# Patient Record
Sex: FEMALE | Race: WHITE | URBAN - METROPOLITAN AREA
[De-identification: names, ages, dates, MRNs, and addresses within clinical notes are randomized per-mention and may not be internally consistent; named-entity substitution may affect disease eponyms.]

---

## 2024-07-02 ENCOUNTER — APPOINTMENT (OUTPATIENT)
Dept: PRIMARY CARE | Facility: CLINIC | Age: 79
End: 2024-07-02
Payer: MEDICARE

## 2024-12-24 ENCOUNTER — APPOINTMENT (OUTPATIENT)
Dept: PRIMARY CARE | Facility: CLINIC | Age: 79
End: 2024-12-24
Payer: MEDICARE

## 2024-12-24 ENCOUNTER — LAB (OUTPATIENT)
Dept: LAB | Facility: LAB | Age: 79
End: 2024-12-24
Payer: MEDICARE

## 2024-12-24 VITALS
BODY MASS INDEX: 25.99 KG/M2 | HEART RATE: 66 BPM | DIASTOLIC BLOOD PRESSURE: 78 MMHG | WEIGHT: 146.7 LBS | OXYGEN SATURATION: 97 % | HEIGHT: 63 IN | SYSTOLIC BLOOD PRESSURE: 144 MMHG

## 2024-12-24 DIAGNOSIS — H93.19 TINNITUS, UNSPECIFIED LATERALITY: ICD-10-CM

## 2024-12-24 DIAGNOSIS — I10 ACCELERATED ESSENTIAL HYPERTENSION: ICD-10-CM

## 2024-12-24 DIAGNOSIS — Z13.220 LIPID SCREENING: ICD-10-CM

## 2024-12-24 DIAGNOSIS — Z00.00 ENCOUNTER FOR ANNUAL WELLNESS EXAM IN MEDICARE PATIENT: Primary | ICD-10-CM

## 2024-12-24 DIAGNOSIS — Z00.00 ROUTINE GENERAL MEDICAL EXAMINATION AT A HEALTH CARE FACILITY: ICD-10-CM

## 2024-12-24 DIAGNOSIS — E55.9 VITAMIN D DEFICIENCY: ICD-10-CM

## 2024-12-24 DIAGNOSIS — H81.10 BENIGN PAROXYSMAL POSITIONAL VERTIGO, UNSPECIFIED LATERALITY: ICD-10-CM

## 2024-12-24 DIAGNOSIS — M81.0 OSTEOPOROSIS: ICD-10-CM

## 2024-12-24 DIAGNOSIS — R42 DIZZINESS: ICD-10-CM

## 2024-12-24 PROBLEM — Z13.820 SCREENING FOR OSTEOPOROSIS: Status: ACTIVE | Noted: 2024-12-24

## 2024-12-24 LAB
25(OH)D3 SERPL-MCNC: 62 NG/ML (ref 30–100)
ALBUMIN SERPL BCP-MCNC: 3.5 G/DL (ref 3.4–5)
ALP SERPL-CCNC: 116 U/L (ref 33–136)
ALT SERPL W P-5'-P-CCNC: 19 U/L (ref 7–45)
ANION GAP SERPL CALC-SCNC: 11 MMOL/L (ref 10–20)
APPEARANCE UR: ABNORMAL
AST SERPL W P-5'-P-CCNC: 34 U/L (ref 9–39)
BILIRUB SERPL-MCNC: 0.6 MG/DL (ref 0–1.2)
BILIRUB UR STRIP.AUTO-MCNC: NEGATIVE MG/DL
BUN SERPL-MCNC: 16 MG/DL (ref 6–23)
CALCIUM SERPL-MCNC: 9.3 MG/DL (ref 8.6–10.6)
CHLORIDE SERPL-SCNC: 104 MMOL/L (ref 98–107)
CHOLEST SERPL-MCNC: 140 MG/DL (ref 0–199)
CHOLESTEROL/HDL RATIO: 2.8
CO2 SERPL-SCNC: 30 MMOL/L (ref 21–32)
COLOR UR: ABNORMAL
CREAT SERPL-MCNC: 0.77 MG/DL (ref 0.5–1.05)
EGFRCR SERPLBLD CKD-EPI 2021: 78 ML/MIN/1.73M*2
ERYTHROCYTE [DISTWIDTH] IN BLOOD BY AUTOMATED COUNT: 13.9 % (ref 11.5–14.5)
GLUCOSE SERPL-MCNC: 91 MG/DL (ref 74–99)
GLUCOSE UR STRIP.AUTO-MCNC: NORMAL MG/DL
HCT VFR BLD AUTO: 37.5 % (ref 36–46)
HDLC SERPL-MCNC: 49.4 MG/DL
HGB BLD-MCNC: 11.5 G/DL (ref 12–16)
KETONES UR STRIP.AUTO-MCNC: NEGATIVE MG/DL
LDLC SERPL CALC-MCNC: 60 MG/DL
LEUKOCYTE ESTERASE UR QL STRIP.AUTO: NEGATIVE
MCH RBC QN AUTO: 28.3 PG (ref 26–34)
MCHC RBC AUTO-ENTMCNC: 30.7 G/DL (ref 32–36)
MCV RBC AUTO: 92 FL (ref 80–100)
NITRITE UR QL STRIP.AUTO: NEGATIVE
NON HDL CHOLESTEROL: 91 MG/DL (ref 0–149)
NRBC BLD-RTO: 0 /100 WBCS (ref 0–0)
PH UR STRIP.AUTO: 6 [PH]
PLATELET # BLD AUTO: 239 X10*3/UL (ref 150–450)
POTASSIUM SERPL-SCNC: 4.1 MMOL/L (ref 3.5–5.3)
PROT SERPL-MCNC: 6.1 G/DL (ref 6.4–8.2)
PROT UR STRIP.AUTO-MCNC: NEGATIVE MG/DL
RBC # BLD AUTO: 4.06 X10*6/UL (ref 4–5.2)
RBC # UR STRIP.AUTO: NEGATIVE /UL
SODIUM SERPL-SCNC: 141 MMOL/L (ref 136–145)
SP GR UR STRIP.AUTO: 1.02
TRIGL SERPL-MCNC: 153 MG/DL (ref 0–149)
TSH SERPL-ACNC: 1.06 MIU/L (ref 0.44–3.98)
UROBILINOGEN UR STRIP.AUTO-MCNC: NORMAL MG/DL
VIT B12 SERPL-MCNC: 650 PG/ML (ref 211–911)
VLDL: 31 MG/DL (ref 0–40)
WBC # BLD AUTO: 3.8 X10*3/UL (ref 4.4–11.3)

## 2024-12-24 PROCEDURE — 99203 OFFICE O/P NEW LOW 30 MIN: CPT | Performed by: FAMILY MEDICINE

## 2024-12-24 PROCEDURE — 1160F RVW MEDS BY RX/DR IN RCRD: CPT | Performed by: FAMILY MEDICINE

## 2024-12-24 PROCEDURE — 80061 LIPID PANEL: CPT

## 2024-12-24 PROCEDURE — G0438 PPPS, INITIAL VISIT: HCPCS | Performed by: FAMILY MEDICINE

## 2024-12-24 PROCEDURE — 1036F TOBACCO NON-USER: CPT | Performed by: FAMILY MEDICINE

## 2024-12-24 PROCEDURE — 1170F FXNL STATUS ASSESSED: CPT | Performed by: FAMILY MEDICINE

## 2024-12-24 PROCEDURE — 82607 VITAMIN B-12: CPT

## 2024-12-24 PROCEDURE — 80053 COMPREHEN METABOLIC PANEL: CPT

## 2024-12-24 PROCEDURE — 81003 URINALYSIS AUTO W/O SCOPE: CPT

## 2024-12-24 PROCEDURE — 82306 VITAMIN D 25 HYDROXY: CPT

## 2024-12-24 PROCEDURE — 3078F DIAST BP <80 MM HG: CPT | Performed by: FAMILY MEDICINE

## 2024-12-24 PROCEDURE — 1159F MED LIST DOCD IN RCRD: CPT | Performed by: FAMILY MEDICINE

## 2024-12-24 PROCEDURE — 84443 ASSAY THYROID STIM HORMONE: CPT

## 2024-12-24 PROCEDURE — 85027 COMPLETE CBC AUTOMATED: CPT

## 2024-12-24 PROCEDURE — 3077F SYST BP >= 140 MM HG: CPT | Performed by: FAMILY MEDICINE

## 2024-12-24 RX ORDER — BISMUTH SUBSALICYLATE 262 MG
1 TABLET,CHEWABLE ORAL DAILY
COMMUNITY

## 2024-12-24 RX ORDER — CYANOCOBALAMIN 1000 UG/ML
INJECTION, SOLUTION INTRAMUSCULAR; SUBCUTANEOUS
COMMUNITY

## 2024-12-24 ASSESSMENT — ACTIVITIES OF DAILY LIVING (ADL)
TAKING_MEDICATION: INDEPENDENT
DRESSING: INDEPENDENT
BATHING: INDEPENDENT
MANAGING_FINANCES: INDEPENDENT
GROCERY_SHOPPING: INDEPENDENT
DOING_HOUSEWORK: INDEPENDENT

## 2024-12-24 ASSESSMENT — PATIENT HEALTH QUESTIONNAIRE - PHQ9
SUM OF ALL RESPONSES TO PHQ9 QUESTIONS 1 AND 2: 0
2. FEELING DOWN, DEPRESSED OR HOPELESS: NOT AT ALL
2. FEELING DOWN, DEPRESSED OR HOPELESS: NOT AT ALL
SUM OF ALL RESPONSES TO PHQ9 QUESTIONS 1 AND 2: 0
1. LITTLE INTEREST OR PLEASURE IN DOING THINGS: NOT AT ALL
1. LITTLE INTEREST OR PLEASURE IN DOING THINGS: NOT AT ALL

## 2024-12-24 NOTE — PROGRESS NOTES
Patient ID: Anabelle Zamora [id number] is a 79 y.o. female who presents for [facility], Tinnitus, and Dizziness.    Patient is here to establish care and Medicare wellness.  Pt is lying down in bed and get Dizziness and lightheaded and ringing in the ear.  Patient noticing increasing tinnitus and bilateral ear throughout the day worse at night.  Patient denies any ear pain or ear discharge no headache no blurry vision no chest pain or shortness of breath    Pt has Crohn's disease - Pt Following Dr. Calvin At [facility]. Pt got 6 injection of Entevio and currently asymptomatic.   Patient denies any nausea vomiting or diarrhea.    Pt had right  knee replacement 7/2024- by dr. Alvarez  Exercise - None   Supplements - taking B12 Injection once a month and taking MVI  Denies smoking/ alcohol or drug use.   Immunization   Flu   , TDAp  , COVID , Pneumonia   , shingrix- declined all shots.  Colonoscopy- had 2 years ago , Marco Antonio LANCE at [facility]  Mammogram - pt is following Dr. Hooker for mammogram  Bone density Scan - given  Had a left humerus fracture last year.  Advised to take calcium vitamin D twice daily.  Patient otherwise independent doing everything by herself.  Patient has good memory good cognition no fall in last 6 months.    Immunization History   Administered Date(s) Administered    PPD Test 02/02/2004    Td (adult) 10/13/2003       Allergies   Allergen Reactions    Infliximab Anaphylaxis and Swelling     Remicade    Other Reaction(s): Anaphylactic Shock, swelling, hives      Remicade    Penicillins Rash and Hives     Other Reaction(s): swelling, hives       Current Outpatient Medications   Medication Sig Dispense Refill    cyanocobalamin (Vitamin B-12) 1,000 mcg/mL injection inject 1 ml under the skin every 4 weeks      MAGNESIUM GLYCINATE ORAL Take 400 mg by mouth 3 times a day.      multivitamin tablet Take 1 tablet by mouth once daily.      vitamin E acetate (VITAMIN E ORAL) Take by mouth.       No current  "facility-administered medications for this visit.       History reviewed. No pertinent past medical history.  Social History     Tobacco Use    Smoking status: Never    Smokeless tobacco: Never   Substance Use Topics    Alcohol use: Not Currently     No family history on file.    BP (!) 181/73   Pulse 66   Ht 1.6 m (5' 3\")   Wt 66.5 kg (146 lb 11.2 oz)   SpO2 97%   BMI 25.99 kg/m²   Review of System  Constitutional:  Negative for appetite change, chills, fatigue, fever and unexpected weight change.   HENT:  Negative for hearing loss and voice change.    Eyes:  Negative for visual disturbance.   Respiratory:  Negative for cough, chest tightness, shortness of breath and wheezing.    Cardiovascular:  Negative for chest pain and leg swelling.   Gastrointestinal:  Negative for abdominal pain, blood in stool, constipation and diarrhea.   Genitourinary:  Negative for difficulty urinating and dysuria.   Musculoskeletal:  Negative for arthralgias and joint swelling.   Skin:  Negative for rash.   Neurological:  Negative for dizziness, weakness, numbness and headaches.   Psychiatric/Behavioral:  Negative for behavioral problems and confusion.      BP (!) 181/73   Pulse 66   Ht 1.6 m (5' 3\")   Wt 66.5 kg (146 lb 11.2 oz)   SpO2 97%   BMI 25.99 kg/m²     General Appearance:    Alert, cooperative, no distress, appears stated age   Head:    Normocephalic, without obvious abnormality, atraumatic   Eyes:    PERRL, conjunctiva/corneas clear, EOM's intact,    Neck:   Supple, symmetrical, No enlargement   Back:     Symmetric, no curvature, ROM normal, no CVA tenderness   Lungs:     Clear to auscultation bilaterally, respirations normal ,no wheezing or ronchi   Chest Wall:    No tenderness or deformity   Abdomen:     Soft, non-tender, bowel sounds active all four quadrants, no masses,   no organomegaly   Extremities:   Extremities normal, atraumatic, no cyanosis or edema   Pulses:   2+ and symmetric all extremities   Skin:   " Skin color, texture, turgor normal, no rashes or lesions   Lymph nodes:   Cervical nodes normal   Neurologic:    normal strength, sensation and reflexes.      No visits with results within 3 Month(s) from this visit.   Latest known visit with results is:   Legacy Encounter on 10/06/1998   Component Date Value Ref Range Status    Pathology Report 10/06/1998    Final                    Value:Name CHARLIE SUTHERLAND                                                                                                   Accession #: [id number]            Pathologist:                     Date of Procedure:    10/6/1998  Date Received:          10/6/1998  Date Reported           10/9/1998  Submitting Physician:   Conversion  Location:                      Other External #                                                                    FINAL DIAGNOSIS   DIAGNOSIS:                  A.   CERVIX, LEEP BIOPSY:                    FOCAL CHRONIC CERVICITIS WITH SQUAMOUS METAPLASIA.          B.   ENDOCERVIX, CURETTINGS:                    FRAGMENTS OF UNREMARKABLE ENDOCERVICAL EPITHELIUM.                                                                   LARISSA Fields M.D.       - Signed by: Conversion [facility]              10/09/98                                                                                                                                                                                                                                                                                                                                                                                                                                                                                                                By the signature on this report, the individual or group listed as making the  Final Interpretation/Diagnosis certifies that they have reviewed this case.           Microscopic Description:   MICROSCOPIC DESCRIPTION:         "10/09/98                  Specimens \"A\" and \"B\" are examined microscopically and the        dictation is omitted.  AB:p                   Clinical History:  (Not Provided)    Specimens Submitted As:  A: Unknown Part Type     Gross Description:   GROSS DESCRIPTION:        10/07/98                  A.   The specimen consists of a LEEP biopsy of cervix, received        in three pieces and pinned on a piece of tongue depressor labelled as      to the 12 and 6 oclock posi                          tions.  The smallest piece measures 1.3 x      0.8 x 0.2 cm.  The largest piece measures 3.2 x 1.8 x 0.5 cm.  The      third piece measures 2.5 x 1.7 x 0.4 cm.  The specimen is divided        into four approximate quadrants, each quadrant sectioned into three      or more pieces and submitted in toto labelled: #1 - 12-3 oclock        position, #2 - 3-6 oclock position, #3 - 6-9 oclock position and #4      - 9-12 oclock position.            B.   The specimen consists of fragments of soft, mucoid, reddish      and grayish-white tissue altogether measuring approximately 1.0 x 0.5      x 0.1 cm. in aggregate.  Submitted in toto.  AB:p                DATE: 10/09/98        TIME  09:33                                [facility]  Department of Pathology   [address] [place] [address]        CONVERTED FINAL DIAGNOSIS 10/06/1998    Final                    Value: DIAGNOSIS:                  A.   CERVIX, LEEP BIOPSY:                    FOCAL CHRONIC CERVICITIS WITH SQUAMOUS METAPLASIA.          B.   ENDOCERVIX, CURETTINGS:                    FRAGMENTS OF UNREMARKABLE ENDOCERVICAL EPITHELIUM.                                                                   LARISSA Fields M.D.       - Signed by: Conversion [facility]              10/09/98      CONVERTED GROSS DESCRIPTION 10/06/1998    Final                    Value: GROSS DESCRIPTION:        10/07/98                  A.   The specimen consists of a LEEP " "biopsy of cervix, received        in three pieces and pinned on a piece of tongue depressor labelled as      to the 12 and 6 oclock positions.  The smallest piece measures 1.3 x      0.8 x 0.2 cm.  The largest piece measures 3.2 x 1.8 x 0.5 cm.  The      third piece measures 2.5 x 1.7 x 0.4 cm.  The specimen is divided        into four approximate quadrants, each quadrant sectioned into three      or more pieces and submitted in toto labelled: #1 - 12-3 oclock        position, #2 - 3-6 oclock position, #3 - 6-9 oclock position and #4      - 9-12 oclock position.            B.   The specimen consists of fragments of soft, mucoid, reddish      and grayish-white tissue altogether measuring approximately 1.0 x 0.5      x 0.1 cm. in aggregate.  Submitted in toto.  AB:p                DATE: 10/09/98        TIME  09:33                     CONVERTED MICROSCOPIC DESCRIPTION 10/06/1998    Final                    Value: MICROSCOPIC DESCRIPTION:        10/09/98                  Specimens \"A\" and \"B\" are examined microscopically and the        dictation is omitted.  AB:p                     CONVERTED SLIDE-BLOCK DESCRIPTION 10/06/1998    Final                    Value: - TISSUES -   Conversion Tissue (A: BIOPSY, CONIZATION)                               CONVERSION GYNECOLOGICAL (B: ENDOCERVICAL CURETTING)                        CONVERTED CARD CASE COMMENT 10/06/1998    Final                    Value: JAYE WAITE M.D.    NAME           [facility] NO.   [id number]                                      ROOM NO.       AMB OLIVO                                      DATE OF SURG.  10/06/98                                      AGE/SEX        53Y F                                      PHYSICIAN      DAVION WAITE                                                     PATHOLOGY REPORT                                        [id number]                        Operation:            LEEP " procedure                Specimen submitted:      A.   BIOPSY, CONIZATION                                  B.   ENDOCERVICAL CURETTING                      [employer] Archived Tests:    PHYSICIAN DIAGNOSIS : ;RECURRENT ABNORMAL PAP    [employer] SNOMED Translations for original [id number]:    [id number] (CERVIX UTERI (EXOCERVIX AND ENDOCERVIX)), [id number] (CHRONIC CE  RVICITIS, NOS     (T-20458))      [id number] (CERVIX UTERI (EXOCERVIX AND ENDOCERVIX)), [id number] (ME                          TAPLASIA ,  SQUAMOUS)      [id number] (ENDOCERVIX), [id number] (METAPLASIA, SQUAMOUS)      [id number] (ENDOCERVICAL EPITHELIUM), [id number] (METAPLASIA, SQUAMOUS)                    CONVERTED REPORT COMMENTS 10/06/1998    Final                    Value:Nathan Case # [id number]        MRN: [id number]  DOS: 10/06/98            - ORD PHYSICIAN: Conversion [facility]   - ORD PROCEDURES -            Conversion Procedure                                             - STATUS HISTORY -     ENT    09/15/2016  1811   Conversion [facility]    SOUT   09/15/2016  1811   Conversion [facility]                      - [place] Conversion Report -      CONVERTED FINAL REPORT PDF LINK TO* 10/06/1998 \\[email or web address]\[email or web address]\PDF [email or web address]_[id number]   Final       Assessment/Plan   Diagnoses and all orders for this visit:  Encounter for annual wellness exam in Medicare patient  Routine general medical examination at a health care facility  -     TSH with reflex to Free T4 if abnormal; Future  -     Lipid Panel; Future  -     Comprehensive Metabolic Panel; Future  -     CBC; Future  -     Urinalysis with Reflex Microscopic; Future  -     Vitamin B12; Future  -     Vitamin D 25-Hydroxy,Total (for eval of Vitamin D levels); Future  Lipid screening  Accelerated essential hypertension  -     CBC; Future  Benign paroxysmal positional vertigo, unspecified laterality  -     Vitamin B12; Future  -     Vitamin D 25-Hydroxy,Total (for eval of Vitamin D levels);  Future  Tinnitus, unspecified laterality  -     Vitamin B12; Future  -     Vitamin D 25-Hydroxy,Total (for eval of Vitamin D levels); Future  Screening for osteoporosis  -     XR DEXA bone density; Future  Osteoporosis  -     XR DEXA bone density; Future  Vitamin D deficiency  -     Vitamin D 25-Hydroxy,Total (for eval of Vitamin D levels); Future      Patient was advised to get blood work  Patient was advised to get bone density scan  Patient is following GYN Dr. Dow and having mammogram done every year  Patient was advised to do BPPV exercises 3 times a day  Will refer to PT for vestibular exercises  Patient made aware if persistent tinnitus she will need hearing eval and referral to ENT.  Patient would like to hold for now.    declined all vaccines

## 2025-01-06 ENCOUNTER — TELEPHONE (OUTPATIENT)
Dept: PRIMARY CARE | Facility: CLINIC | Age: 80
End: 2025-01-06
Payer: MEDICARE

## 2025-01-23 ENCOUNTER — APPOINTMENT (OUTPATIENT)
Dept: PRIMARY CARE | Facility: CLINIC | Age: 80
End: 2025-01-23
Payer: MEDICARE

## 2025-01-23 VITALS
HEART RATE: 73 BPM | SYSTOLIC BLOOD PRESSURE: 173 MMHG | HEIGHT: 63 IN | DIASTOLIC BLOOD PRESSURE: 81 MMHG | WEIGHT: 151.2 LBS | OXYGEN SATURATION: 96 % | BODY MASS INDEX: 26.79 KG/M2

## 2025-01-23 DIAGNOSIS — R42 DIZZINESS: ICD-10-CM

## 2025-01-23 DIAGNOSIS — K50.90 CROHN'S DISEASE WITHOUT COMPLICATION, UNSPECIFIED GASTROINTESTINAL TRACT LOCATION: Primary | ICD-10-CM

## 2025-01-23 DIAGNOSIS — H81.10 BENIGN PAROXYSMAL POSITIONAL VERTIGO, UNSPECIFIED LATERALITY: ICD-10-CM

## 2025-01-23 PROCEDURE — 1159F MED LIST DOCD IN RCRD: CPT | Performed by: FAMILY MEDICINE

## 2025-01-23 PROCEDURE — 99213 OFFICE O/P EST LOW 20 MIN: CPT | Performed by: FAMILY MEDICINE

## 2025-01-23 PROCEDURE — 1123F ACP DISCUSS/DSCN MKR DOCD: CPT | Performed by: FAMILY MEDICINE

## 2025-01-23 PROCEDURE — 3077F SYST BP >= 140 MM HG: CPT | Performed by: FAMILY MEDICINE

## 2025-01-23 PROCEDURE — 1036F TOBACCO NON-USER: CPT | Performed by: FAMILY MEDICINE

## 2025-01-23 PROCEDURE — 3079F DIAST BP 80-89 MM HG: CPT | Performed by: FAMILY MEDICINE

## 2025-01-23 RX ORDER — NORGESTIMATE AND ETHINYL ESTRADIOL 7DAYSX3 28
1 KIT ORAL DAILY
Qty: 28 TABLET | Refills: 12 | Status: CANCELLED | OUTPATIENT
Start: 2025-01-23 | End: 2026-01-23

## 2025-01-23 ASSESSMENT — PATIENT HEALTH QUESTIONNAIRE - PHQ9
2. FEELING DOWN, DEPRESSED OR HOPELESS: NOT AT ALL
1. LITTLE INTEREST OR PLEASURE IN DOING THINGS: NOT AT ALL
SUM OF ALL RESPONSES TO PHQ9 QUESTIONS 1 AND 2: 0

## 2025-01-23 NOTE — ASSESSMENT & PLAN NOTE
Patient following GI at [facility] currently asymptomatic. S/p Venancio

## 2025-01-23 NOTE — PROGRESS NOTES
"Patient ID: Anabelle Zamora [id number] is a 79 y.o. female who presents for Follow-up (Follow up).    Is here for follow-up  BPPV patient is doing BPPV exercises 2-3 times a day.  Dizziness is much better.  Denies any headache or blurry vision.  No chest pain or shortness of breath.    Pt has Crohn's disease - Pt Following Dr. Calvin At [facility]. Pt got 6 injection of Entevio and currently asymptomatic.   Patient denies any nausea vomiting or diarrhea.    Pt had right  knee replacement 7/2024- by dr. Alvarez   Supplements - taking B12 Injection once a month and taking MVI  Flu   , TDAp  , COVID , Pneumonia   , shingrix- declined all shots.  Colonoscopy- had 2 years ago , Marco Antonio LANCE at [facility]  Mammogram - pt is following Dr. Hooker for mammogram    Immunization History   Administered Date(s) Administered    PPD Test 02/02/2004    Td (adult) 10/13/2003       Allergies   Allergen Reactions    Infliximab Anaphylaxis and Swelling     Remicade    Other Reaction(s): Anaphylactic Shock, swelling, hives      Remicade    Penicillins Rash and Hives     Other Reaction(s): swelling, hives       Current Outpatient Medications   Medication Sig Dispense Refill    cyanocobalamin (Vitamin B-12) 1,000 mcg/mL injection inject 1 ml under the skin every 4 weeks      MAGNESIUM GLYCINATE ORAL Take 400 mg by mouth 3 times a day.      multivitamin tablet Take 1 tablet by mouth once daily.      vitamin E acetate (VITAMIN E ORAL) Take by mouth.       No current facility-administered medications for this visit.       Past Medical History:   Diagnosis Date    CD (Crohn's disease) (Multi)      Social History     Tobacco Use    Smoking status: Never    Smokeless tobacco: Never   Vaping Use    Vaping status: Never Used   Substance Use Topics    Alcohol use: Not Currently    Drug use: Never         /81   Pulse 73   Ht 1.6 m (5' 3\")   Wt 68.6 kg (151 lb 3.2 oz)   SpO2 96%   BMI 26.78 kg/m²     /79   Pulse 73   Ht 1.6 m (5' 3\")   Wt 68.6 kg " (151 lb 3.2 oz)   SpO2 96%   BMI 26.78 kg/m²     General Appearance:  Alert, cooperative, no distress,   Head:  Normocephalic, atraumatic   Eyes:  PERRL, conjunctiva/corneas clear, EOM's intact,    Lungs:   Clear to auscultation bilaterally, respirations unlabored   Heart:  Regular rate and rhythm, S1 and S2 normal, no murmur,    Extremities: Extremities normal, No edema   Neurologic: Normal       Lab on 12/24/2024   Component Date Value Ref Range Status    Thyroid Stimulating Hormone 12/24/2024 1.06  0.44 - 3.98 mIU/L Final    Cholesterol 12/24/2024 140  0 - 199 mg/dL Final          Age      Desirable   Borderline High   High     0-19 Y     0 - 169       170 - 199     >/= 200    20-24 Y     0 - 189       190 - 224     >/= 225         >24 Y     0 - 199       200 - 239     >/= 240   **All ranges are based on fasting samples. Specific   therapeutic targets will vary based on patient-specific   cardiac risk.    Pediatric guidelines reference:Pediatrics 2011, 128(S5).Adult guidelines reference: NCEP ATPIII Guidelines,[place] 2001, 258:2486-97    Venipuncture immediately after or during the administration of Metamizole may lead to falsely low results. Testing should be performed immediately prior to Metamizole dosing.    HDL-Cholesterol 12/24/2024 49.4  mg/dL Final      Age       Very Low   Low     Normal    High    0-19 Y    < 35      < 40     40-45     ----  20-24 Y    ----     < 40      >45      ----        >24 Y      ----     < 40     40-60      >60      Cholesterol/HDL Ratio 12/24/2024 2.8   Final      Ref Values  Desirable  < 3.4  High Risk  > 5.0    LDL Calculated 12/24/2024 60  <=99 mg/dL Final                                Near   Borderline      AGE      Desirable  Optimal    High     High     Very High     0-19 Y     0 - 109     ---    110-129   >/= 130     ----    20-24 Y     0 - 119     ---    120-159   >/= 160     ----      >24 Y     0 -  99   100-129  130-159   160-189     >/=190      VLDL 12/24/2024 31  0  - 40 mg/dL Final    Triglycerides 12/24/2024 153 (H)  0 - 149 mg/dL Final    Age              Desirable        Borderline         High        Very High  SEX:B           mg/dL             mg/dL               mg/dL      mg/dL  <=14D                       ----               ----        ----  15D-365D                    ----               ----        ----  1Y-9Y           0-74               75-99             >=100       ----  10Y-19Y        0-89                            >=130       ----  20Y-24Y        0-114             115-149             >=150      ----  >= 25Y         0-149             150-199             200-499    >=500      Venipuncture immediately after or during the administration of Metamizole may lead to falsely low results. Testing should be performed immediately prior to Metamizole dosing.    Non HDL Cholesterol 12/24/2024 91  0 - 149 mg/dL Final          Age       Desirable   Borderline High   High     Very High     0-19 Y     0 - 119       120 - 144     >/= 145    >/= 160    20-24 Y     0 - 149       150 - 189     >/= 190      ----         >24 Y    30 mg/dL above LDL Cholesterol goal      Glucose 12/24/2024 91  74 - 99 mg/dL Final    Sodium 12/24/2024 141  136 - 145 mmol/L Final    Potassium 12/24/2024 4.1  3.5 - 5.3 mmol/L Final    Chloride 12/24/2024 104  98 - 107 mmol/L Final    Bicarbonate 12/24/2024 30  21 - 32 mmol/L Final    Anion Gap 12/24/2024 11  10 - 20 mmol/L Final    Urea Nitrogen 12/24/2024 16  6 - 23 mg/dL Final    Creatinine 12/24/2024 0.77  0.50 - 1.05 mg/dL Final    eGFR 12/24/2024 78  >60 mL/min/1.73m*2 Final    Calculations of estimated GFR are performed using the 2021 CKD-EPI Study Refit equation without the race variable for the IDMS-Traceable creatinine methods.  https://jasn.asnjournals.org/content/early/2021/09/22/ASN.8663071062    Calcium 12/24/2024 9.3  8.6 - 10.6 mg/dL Final    Albumin 12/24/2024 3.5  3.4 - 5.0 g/dL Final    Alkaline Phosphatase 12/24/2024  116  33 - 136 U/L Final    Total Protein 12/24/2024 6.1 (L)  6.4 - 8.2 g/dL Final    AST 12/24/2024 34  9 - 39 U/L Final    Bilirubin, Total 12/24/2024 0.6  0.0 - 1.2 mg/dL Final    ALT 12/24/2024 19  7 - 45 U/L Final    Patients treated with Sulfasalazine may generate falsely decreased results for ALT.    WBC 12/24/2024 3.8 (L)  4.4 - 11.3 x10*3/uL Final    nRBC 12/24/2024 0.0  0.0 - 0.0 /100 WBCs Final    RBC 12/24/2024 4.06  4.00 - 5.20 x10*6/uL Final    Hemoglobin 12/24/2024 11.5 (L)  12.0 - 16.0 g/dL Final    Hematocrit 12/24/2024 37.5  36.0 - 46.0 % Final    MCV 12/24/2024 92  80 - 100 fL Final    MCH 12/24/2024 28.3  26.0 - 34.0 pg Final    MCHC 12/24/2024 30.7 (L)  32.0 - 36.0 g/dL Final    RDW 12/24/2024 13.9  11.5 - 14.5 % Final    Platelets 12/24/2024 239  150 - 450 x10*3/uL Final    Color, Urine 12/24/2024 Light-Yellow  Light-Yellow, Yellow, Dark-Yellow Final    Appearance, Urine 12/24/2024 Turbid (N)  Clear Final    Specific Gravity, Urine 12/24/2024 1.018  1.005 - 1.035 Final    pH, Urine 12/24/2024 6.0  5.0, 5.5, 6.0, 6.5, 7.0, 7.5, 8.0 Final    Protein, Urine 12/24/2024 NEGATIVE  NEGATIVE, 10 (TRACE), 20 (TRACE) mg/dL Final    Glucose, Urine 12/24/2024 Normal  Normal mg/dL Final    Blood, Urine 12/24/2024 NEGATIVE  NEGATIVE Final    Ketones, Urine 12/24/2024 NEGATIVE  NEGATIVE mg/dL Final    Bilirubin, Urine 12/24/2024 NEGATIVE  NEGATIVE Final    Urobilinogen, Urine 12/24/2024 Normal  Normal mg/dL Final    Nitrite, Urine 12/24/2024 NEGATIVE  NEGATIVE Final    Leukocyte Esterase, Urine 12/24/2024 NEGATIVE  NEGATIVE Final    Vitamin B12 12/24/2024 650  211 - 911 pg/mL Final    Vitamin D, 25-Hydroxy, Total 12/24/2024 62  30 - 100 ng/mL Final       Assessment/Plan   Diagnoses and all orders for this visit:  Anabelle was seen today for follow-up.  Diagnoses and all orders for this visit:  Crohn's disease without complication, unspecified gastrointestinal tract location (Primary)  Benign paroxysmal positional  vertigo, unspecified laterality  Dizziness    Dizziness better    reviewed blood work and normal  Patient was advised to get bone density scan  Patient is following GYN Dr. Dow and having mammogram done every year      declined all vaccines    Repeat BP 2 weeks as nurse visit

## 2025-01-24 ENCOUNTER — TELEPHONE (OUTPATIENT)
Dept: PRIMARY CARE | Facility: CLINIC | Age: 80
End: 2025-01-24
Payer: MEDICARE

## 2025-01-24 NOTE — TELEPHONE ENCOUNTER
----- Message from Awa Weaver sent at 1/23/2025  5:27 PM EST -----  Schedule Nurse Visit for repeat blood pressure in 2 to 3-week

## 2025-05-14 ENCOUNTER — OFFICE VISIT (OUTPATIENT)
Dept: PRIMARY CARE | Facility: CLINIC | Age: 80
End: 2025-05-14
Payer: MEDICARE

## 2025-05-14 VITALS
OXYGEN SATURATION: 96 % | HEIGHT: 63 IN | WEIGHT: 147.6 LBS | DIASTOLIC BLOOD PRESSURE: 66 MMHG | SYSTOLIC BLOOD PRESSURE: 130 MMHG | HEART RATE: 87 BPM | BODY MASS INDEX: 26.15 KG/M2

## 2025-05-14 DIAGNOSIS — L50.9 URTICARIA: Primary | ICD-10-CM

## 2025-05-14 PROCEDURE — 99214 OFFICE O/P EST MOD 30 MIN: CPT | Performed by: FAMILY MEDICINE

## 2025-05-14 PROCEDURE — 1036F TOBACCO NON-USER: CPT | Performed by: FAMILY MEDICINE

## 2025-05-14 PROCEDURE — 96372 THER/PROPH/DIAG INJ SC/IM: CPT | Performed by: FAMILY MEDICINE

## 2025-05-14 PROCEDURE — 3078F DIAST BP <80 MM HG: CPT | Performed by: FAMILY MEDICINE

## 2025-05-14 PROCEDURE — 1159F MED LIST DOCD IN RCRD: CPT | Performed by: FAMILY MEDICINE

## 2025-05-14 PROCEDURE — 3075F SYST BP GE 130 - 139MM HG: CPT | Performed by: FAMILY MEDICINE

## 2025-05-14 RX ORDER — FAMOTIDINE 20 MG/1
20 TABLET, FILM COATED ORAL 2 TIMES DAILY
COMMUNITY
Start: 2025-05-12 | End: 2025-05-19

## 2025-05-14 RX ORDER — FAMOTIDINE 20 MG/1
20 TABLET, FILM COATED ORAL 2 TIMES DAILY
Qty: 60 TABLET | Refills: 5 | Status: SHIPPED | OUTPATIENT
Start: 2025-05-14 | End: 2025-11-10

## 2025-05-14 RX ORDER — FEXOFENADINE HCL 180 MG/1
180 TABLET ORAL
COMMUNITY
Start: 2025-05-12

## 2025-05-14 RX ORDER — PARSLEY 450 MG
1 CAPSULE ORAL DAILY
COMMUNITY

## 2025-05-14 RX ORDER — TRIAMCINOLONE ACETONIDE 40 MG/ML
40 INJECTION, SUSPENSION INTRA-ARTICULAR; INTRAMUSCULAR ONCE
Status: COMPLETED | OUTPATIENT
Start: 2025-05-14 | End: 2025-05-14

## 2025-05-14 RX ORDER — CETIRIZINE HYDROCHLORIDE 10 MG/1
10 TABLET ORAL NIGHTLY
COMMUNITY
Start: 2025-05-12

## 2025-05-14 RX ORDER — PREDNISONE 20 MG/1
20 TABLET ORAL 2 TIMES DAILY
Qty: 10 TABLET | Refills: 0 | Status: SHIPPED | OUTPATIENT
Start: 2025-05-14 | End: 2025-05-19

## 2025-05-14 RX ORDER — HYDROXYZINE HYDROCHLORIDE 25 MG/1
25 TABLET, FILM COATED ORAL EVERY 6 HOURS PRN
COMMUNITY
Start: 2025-05-12 | End: 2025-05-19

## 2025-05-14 RX ADMIN — TRIAMCINOLONE ACETONIDE 40 MG: 40 INJECTION, SUSPENSION INTRA-ARTICULAR; INTRAMUSCULAR at 11:52

## 2025-05-14 ASSESSMENT — PATIENT HEALTH QUESTIONNAIRE - PHQ9
2. FEELING DOWN, DEPRESSED OR HOPELESS: NOT AT ALL
SUM OF ALL RESPONSES TO PHQ9 QUESTIONS 1 AND 2: 0
1. LITTLE INTEREST OR PLEASURE IN DOING THINGS: NOT AT ALL

## 2025-05-14 NOTE — PROGRESS NOTES
"Subjective   Patient ID: Anabelle Zamora [id number] is a 80 y.o. female who presents for Rash (In went to ER twice for hives not getting better and burning now).    HPI   Patient went to [facility] ER on May 11 and May 12 for rash on the chest and patient was given IV steroid and was sent home with hydroxyzine and Benadryl.  Patient noticing worsening of rash all over the body it is more erythematous patchy rash over chest abdomen back and upper back patient denies chest pain shortness of breath headache or dizziness.  Noticing itching.    Social History[1]    Allergies[2]    Current Medications[3]    Physical Exam  /81   Pulse 87   Ht 1.6 m (5' 3\")   Wt 67 kg (147 lb 9.6 oz)   SpO2 96%   BMI 26.15 kg/m²     General Appearance:  Alert, cooperative, no distress,   Head:  Normocephalic, atraumatic   Eyes:  PERRL, conjunctiva/corneas clear, EOM's intact,    Lungs:   Clear to auscultation bilaterally, respirations unlabored   Heart:  Regular rate and rhythm, S1 and S2 normal, no murmur,    Neurologic: Normal                                  SKIn - erythematous patchy rash all over chest,abdomen/back  No visits with results within 3 Month(s) from this visit.   Latest known visit with results is:   Lab on 12/24/2024   Component Date Value Ref Range Status    Thyroid Stimulating Hormone 12/24/2024 1.06  0.44 - 3.98 mIU/L Final    Cholesterol 12/24/2024 140  0 - 199 mg/dL Final          Age      Desirable   Borderline High   High     0-19 Y     0 - 169       170 - 199     >/= 200    20-24 Y     0 - 189       190 - 224     >/= 225         >24 Y     0 - 199       200 - 239     >/= 240   **All ranges are based on fasting samples. Specific   therapeutic targets will vary based on patient-specific   cardiac risk.    Pediatric guidelines reference:Pediatrics 2011, 128(S5).Adult guidelines reference: NCEP ATPIII Guidelines,[place] 2001, 258:2486-97    Venipuncture immediately after or during the administration of Metamizole " may lead to falsely low results. Testing should be performed immediately prior to Metamizole dosing.    HDL-Cholesterol 12/24/2024 49.4  mg/dL Final      Age       Very Low   Low     Normal    High    0-19 Y    < 35      < 40     40-45     ----  20-24 Y    ----     < 40      >45      ----        >24 Y      ----     < 40     40-60      >60      Cholesterol/HDL Ratio 12/24/2024 2.8   Final      Ref Values  Desirable  < 3.4  High Risk  > 5.0    LDL Calculated 12/24/2024 60  <=99 mg/dL Final                                Near   Borderline      AGE      Desirable  Optimal    High     High     Very High     0-19 Y     0 - 109     ---    110-129   >/= 130     ----    20-24 Y     0 - 119     ---    120-159   >/= 160     ----      >24 Y     0 -  99   100-129  130-159   160-189     >/=190      VLDL 12/24/2024 31  0 - 40 mg/dL Final    Triglycerides 12/24/2024 153 (H)  0 - 149 mg/dL Final    Age              Desirable        Borderline         High        Very High  SEX:B           mg/dL             mg/dL               mg/dL      mg/dL  <=14D                       ----               ----        ----  15D-365D                    ----               ----        ----  1Y-9Y           0-74               75-99             >=100       ----  10Y-19Y        0-89                            >=130       ----  20Y-24Y        0-114             115-149             >=150      ----  >= 25Y         0-149             150-199             200-499    >=500      Venipuncture immediately after or during the administration of Metamizole may lead to falsely low results. Testing should be performed immediately prior to Metamizole dosing.    Non HDL Cholesterol 12/24/2024 91  0 - 149 mg/dL Final          Age       Desirable   Borderline High   High     Very High     0-19 Y     0 - 119       120 - 144     >/= 145    >/= 160    20-24 Y     0 - 149       150 - 189     >/= 190      ----         >24 Y    30 mg/dL above LDL Cholesterol  goal      Glucose 12/24/2024 91  74 - 99 mg/dL Final    Sodium 12/24/2024 141  136 - 145 mmol/L Final    Potassium 12/24/2024 4.1  3.5 - 5.3 mmol/L Final    Chloride 12/24/2024 104  98 - 107 mmol/L Final    Bicarbonate 12/24/2024 30  21 - 32 mmol/L Final    Anion Gap 12/24/2024 11  10 - 20 mmol/L Final    Urea Nitrogen 12/24/2024 16  6 - 23 mg/dL Final    Creatinine 12/24/2024 0.77  0.50 - 1.05 mg/dL Final    eGFR 12/24/2024 78  >60 mL/min/1.73m*2 Final    Calculations of estimated GFR are performed using the 2021 CKD-EPI Study Refit equation without the race variable for the IDMS-Traceable creatinine methods.  https://jasn.asnjournals.org/content/early/2021/09/22/ASN.1330864257    Calcium 12/24/2024 9.3  8.6 - 10.6 mg/dL Final    Albumin 12/24/2024 3.5  3.4 - 5.0 g/dL Final    Alkaline Phosphatase 12/24/2024 116  33 - 136 U/L Final    Total Protein 12/24/2024 6.1 (L)  6.4 - 8.2 g/dL Final    AST 12/24/2024 34  9 - 39 U/L Final    Bilirubin, Total 12/24/2024 0.6  0.0 - 1.2 mg/dL Final    ALT 12/24/2024 19  7 - 45 U/L Final    Patients treated with Sulfasalazine may generate falsely decreased results for ALT.    WBC 12/24/2024 3.8 (L)  4.4 - 11.3 x10*3/uL Final    nRBC 12/24/2024 0.0  0.0 - 0.0 /100 WBCs Final    RBC 12/24/2024 4.06  4.00 - 5.20 x10*6/uL Final    Hemoglobin 12/24/2024 11.5 (L)  12.0 - 16.0 g/dL Final    Hematocrit 12/24/2024 37.5  36.0 - 46.0 % Final    MCV 12/24/2024 92  80 - 100 fL Final    MCH 12/24/2024 28.3  26.0 - 34.0 pg Final    MCHC 12/24/2024 30.7 (L)  32.0 - 36.0 g/dL Final    RDW 12/24/2024 13.9  11.5 - 14.5 % Final    Platelets 12/24/2024 239  150 - 450 x10*3/uL Final    Color, Urine 12/24/2024 Light-Yellow  Light-Yellow, Yellow, Dark-Yellow Final    Appearance, Urine 12/24/2024 Turbid (N)  Clear Final    Specific Gravity, Urine 12/24/2024 1.018  1.005 - 1.035 Final    pH, Urine 12/24/2024 6.0  5.0, 5.5, 6.0, 6.5, 7.0, 7.5, 8.0 Final    Protein, Urine 12/24/2024 NEGATIVE  NEGATIVE, 10  (TRACE), 20 (TRACE) mg/dL Final    Glucose, Urine 12/24/2024 Normal  Normal mg/dL Final    Blood, Urine 12/24/2024 NEGATIVE  NEGATIVE Final    Ketones, Urine 12/24/2024 NEGATIVE  NEGATIVE mg/dL Final    Bilirubin, Urine 12/24/2024 NEGATIVE  NEGATIVE Final    Urobilinogen, Urine 12/24/2024 Normal  Normal mg/dL Final    Nitrite, Urine 12/24/2024 NEGATIVE  NEGATIVE Final    Leukocyte Esterase, Urine 12/24/2024 NEGATIVE  NEGATIVE Final    Vitamin B12 12/24/2024 650  211 - 911 pg/mL Final    Vitamin D, 25-Hydroxy, Total 12/24/2024 62  30 - 100 ng/mL Final       Assessment/Plan   Diagnoses and all orders for this visit:  Urticaria  -     predniSONE (Deltasone) 20 mg tablet; Take 1 tablet (20 mg) by mouth 2 times a day for 5 days.  -     famotidine (Pepcid) 20 mg tablet; Take 1 tablet (20 mg) by mouth 2 times a day.  -     triamcinolone acetonide (Kenalog-40) injection 40 mg  Patient was advised to continue oral prednisone twice daily and Pepcid twice daily can take Benadryl every 6  Triamcinolone 40 given in the office  Return to clinic if not better or worse     patient was recently seen in the hospital and patient's hospital record has been reviewed with the patient including, but not limited to, discharge summary, labs, imaging, consultation notes (if pertinent). Ambulatory medication list and discharge hospitalization list has been compared and updated for changes.           [1]   Social History  Tobacco Use    Smoking status: Never    Smokeless tobacco: Never   Vaping Use    Vaping status: Never Used   Substance Use Topics    Alcohol use: Not Currently    Drug use: Never   [2]   Allergies  Allergen Reactions    Infliximab Anaphylaxis, Swelling and Hives     Remicade    Other Reaction(s): Anaphylactic Shock, swelling, hives      Remicade    Remicade (reaction was in the 70's)    Penicillins Rash and Hives     Other Reaction(s): swelling, hives   [3]   Current Outpatient Medications   Medication Sig Dispense Refill     cetirizine (ZyrTEC) 10 mg tablet Take 1 tablet (10 mg) by mouth once daily at bedtime.      cyanocobalamin (Vitamin B-12) 1,000 mcg/mL injection inject 1 ml under the skin every 4 weeks      famotidine (Pepcid) 20 mg tablet Take 1 tablet (20 mg) by mouth twice a day.      fexofenadine (Allegra) 180 mg tablet Take 1 tablet (180 mg) by mouth once daily.      hydrOXYzine HCL (Atarax) 25 mg tablet Take 1 tablet (25 mg) by mouth every 6 hours if needed.      MAGNESIUM GLYCINATE ORAL Take 400 mg by mouth 3 times a day.      multivitamin tablet Take 1 tablet by mouth once daily.      turmeric root extract 500 mg tablet Take 1 tablet by mouth once daily.      vitamin E acetate (VITAMIN E ORAL) Take by mouth.       No current facility-administered medications for this visit.

## 2025-05-16 ENCOUNTER — TELEPHONE (OUTPATIENT)
Dept: PRIMARY CARE | Facility: CLINIC | Age: 80
End: 2025-05-16
Payer: MEDICARE
